# Patient Record
Sex: MALE | Race: WHITE | NOT HISPANIC OR LATINO | Employment: FULL TIME | URBAN - METROPOLITAN AREA
[De-identification: names, ages, dates, MRNs, and addresses within clinical notes are randomized per-mention and may not be internally consistent; named-entity substitution may affect disease eponyms.]

---

## 2023-02-28 LAB
ALANINE AMINOTRANSFERASE (SGPT) (U/L) IN SER/PLAS: 25 U/L (ref 10–52)
ALBUMIN (G/DL) IN SER/PLAS: 4.7 G/DL (ref 3.4–5)
ALKALINE PHOSPHATASE (U/L) IN SER/PLAS: 55 U/L (ref 33–120)
ANION GAP IN SER/PLAS: 15 MMOL/L (ref 10–20)
ASPARTATE AMINOTRANSFERASE (SGOT) (U/L) IN SER/PLAS: 21 U/L (ref 9–39)
BILIRUBIN TOTAL (MG/DL) IN SER/PLAS: 1.7 MG/DL (ref 0–1.2)
CALCIDIOL (25 OH VITAMIN D3) (NG/ML) IN SER/PLAS: 34 NG/ML
CALCIUM (MG/DL) IN SER/PLAS: 9.9 MG/DL (ref 8.6–10.3)
CARBON DIOXIDE, TOTAL (MMOL/L) IN SER/PLAS: 26 MMOL/L (ref 21–32)
CHLORIDE (MMOL/L) IN SER/PLAS: 105 MMOL/L (ref 98–107)
CHOLESTEROL (MG/DL) IN SER/PLAS: 97 MG/DL (ref 0–199)
CHOLESTEROL IN HDL (MG/DL) IN SER/PLAS: 36.2 MG/DL
CHOLESTEROL/HDL RATIO: 2.7
CREATININE (MG/DL) IN SER/PLAS: 1.28 MG/DL (ref 0.5–1.3)
ERYTHROCYTE DISTRIBUTION WIDTH (RATIO) BY AUTOMATED COUNT: 12.9 % (ref 11.5–14.5)
ERYTHROCYTE MEAN CORPUSCULAR HEMOGLOBIN CONCENTRATION (G/DL) BY AUTOMATED: 34.1 G/DL (ref 32–36)
ERYTHROCYTE MEAN CORPUSCULAR VOLUME (FL) BY AUTOMATED COUNT: 89 FL (ref 80–100)
ERYTHROCYTES (10*6/UL) IN BLOOD BY AUTOMATED COUNT: 5.16 X10E12/L (ref 4.5–5.9)
GFR MALE: 66 ML/MIN/1.73M2
GLUCOSE (MG/DL) IN SER/PLAS: 95 MG/DL (ref 74–99)
HEMATOCRIT (%) IN BLOOD BY AUTOMATED COUNT: 45.7 % (ref 41–52)
HEMOGLOBIN (G/DL) IN BLOOD: 15.6 G/DL (ref 13.5–17.5)
LDL: 37 MG/DL (ref 0–99)
LEUKOCYTES (10*3/UL) IN BLOOD BY AUTOMATED COUNT: 6.3 X10E9/L (ref 4.4–11.3)
NRBC (PER 100 WBCS) BY AUTOMATED COUNT: 0 /100 WBC (ref 0–0)
PLATELETS (10*3/UL) IN BLOOD AUTOMATED COUNT: NORMAL X10E9/L (ref 150–450)
POTASSIUM (MMOL/L) IN SER/PLAS: 4.5 MMOL/L (ref 3.5–5.3)
PROSTATE SPECIFIC AG (NG/ML) IN SER/PLAS: 1.57 NG/ML (ref 0–4)
PROTEIN TOTAL: 6.9 G/DL (ref 6.4–8.2)
SODIUM (MMOL/L) IN SER/PLAS: 141 MMOL/L (ref 136–145)
TRIGLYCERIDE (MG/DL) IN SER/PLAS: 119 MG/DL (ref 0–149)
URATE (MG/DL) IN SER/PLAS: 5.4 MG/DL (ref 4–7.5)
UREA NITROGEN (MG/DL) IN SER/PLAS: 18 MG/DL (ref 6–23)
VLDL: 24 MG/DL (ref 0–40)

## 2024-03-14 ENCOUNTER — LAB (OUTPATIENT)
Dept: LAB | Facility: LAB | Age: 59
End: 2024-03-14
Payer: COMMERCIAL

## 2024-03-14 DIAGNOSIS — Z12.5 ENCOUNTER FOR SCREENING FOR MALIGNANT NEOPLASM OF PROSTATE: Primary | ICD-10-CM

## 2024-03-14 LAB
25(OH)D3 SERPL-MCNC: 41 NG/ML (ref 30–100)
ALBUMIN SERPL BCP-MCNC: 4.6 G/DL (ref 3.4–5)
ALP SERPL-CCNC: 61 U/L (ref 33–120)
ALT SERPL W P-5'-P-CCNC: 26 U/L (ref 10–52)
ANION GAP SERPL CALC-SCNC: 13 MMOL/L (ref 10–20)
AST SERPL W P-5'-P-CCNC: 20 U/L (ref 9–39)
BASOPHILS # BLD AUTO: 0.07 X10*3/UL (ref 0–0.1)
BASOPHILS NFR BLD AUTO: 1.2 %
BILIRUB SERPL-MCNC: 1.8 MG/DL (ref 0–1.2)
BUN SERPL-MCNC: 18 MG/DL (ref 6–23)
CALCIUM SERPL-MCNC: 9.4 MG/DL (ref 8.6–10.3)
CHLORIDE SERPL-SCNC: 107 MMOL/L (ref 98–107)
CHOLEST SERPL-MCNC: 93 MG/DL (ref 0–199)
CHOLESTEROL/HDL RATIO: 3.4
CO2 SERPL-SCNC: 25 MMOL/L (ref 21–32)
CREAT SERPL-MCNC: 1.26 MG/DL (ref 0.5–1.3)
EGFRCR SERPLBLD CKD-EPI 2021: 67 ML/MIN/1.73M*2
EOSINOPHIL # BLD AUTO: 0.2 X10*3/UL (ref 0–0.7)
EOSINOPHIL NFR BLD AUTO: 3.5 %
ERYTHROCYTE [DISTWIDTH] IN BLOOD BY AUTOMATED COUNT: 12.8 % (ref 11.5–14.5)
GLUCOSE SERPL-MCNC: 98 MG/DL (ref 74–99)
HCT VFR BLD AUTO: 44.6 % (ref 41–52)
HDLC SERPL-MCNC: 27.4 MG/DL
HGB BLD-MCNC: 15.3 G/DL (ref 13.5–17.5)
IMM GRANULOCYTES # BLD AUTO: 0.02 X10*3/UL (ref 0–0.7)
IMM GRANULOCYTES NFR BLD AUTO: 0.3 % (ref 0–0.9)
LDLC SERPL CALC-MCNC: 37 MG/DL
LYMPHOCYTES # BLD AUTO: 2.08 X10*3/UL (ref 1.2–4.8)
LYMPHOCYTES NFR BLD AUTO: 36.2 %
MCH RBC QN AUTO: 30.7 PG (ref 26–34)
MCHC RBC AUTO-ENTMCNC: 34.3 G/DL (ref 32–36)
MCV RBC AUTO: 90 FL (ref 80–100)
MONOCYTES # BLD AUTO: 0.48 X10*3/UL (ref 0.1–1)
MONOCYTES NFR BLD AUTO: 8.4 %
NEUTROPHILS # BLD AUTO: 2.89 X10*3/UL (ref 1.2–7.7)
NEUTROPHILS NFR BLD AUTO: 50.4 %
NON HDL CHOLESTEROL: 66 MG/DL (ref 0–149)
NRBC BLD-RTO: 0 /100 WBCS (ref 0–0)
PLATELET # BLD AUTO: 78 X10*3/UL (ref 150–450)
POTASSIUM SERPL-SCNC: 3.9 MMOL/L (ref 3.5–5.3)
PROT SERPL-MCNC: 6.8 G/DL (ref 6.4–8.2)
PSA SERPL-MCNC: 1.88 NG/ML
RBC # BLD AUTO: 4.98 X10*6/UL (ref 4.5–5.9)
SODIUM SERPL-SCNC: 141 MMOL/L (ref 136–145)
TRIGL SERPL-MCNC: 142 MG/DL (ref 0–149)
URATE SERPL-MCNC: 5.6 MG/DL (ref 4–7.5)
VLDL: 28 MG/DL (ref 0–40)
WBC # BLD AUTO: 5.7 X10*3/UL (ref 4.4–11.3)

## 2024-03-14 PROCEDURE — 84153 ASSAY OF PSA TOTAL: CPT

## 2024-03-14 PROCEDURE — 80061 LIPID PANEL: CPT

## 2024-03-14 PROCEDURE — 85025 COMPLETE CBC W/AUTO DIFF WBC: CPT

## 2024-03-14 PROCEDURE — 82306 VITAMIN D 25 HYDROXY: CPT

## 2024-03-14 PROCEDURE — 80053 COMPREHEN METABOLIC PANEL: CPT

## 2024-03-14 PROCEDURE — 36415 COLL VENOUS BLD VENIPUNCTURE: CPT

## 2024-03-14 PROCEDURE — 84550 ASSAY OF BLOOD/URIC ACID: CPT

## 2024-04-16 PROBLEM — I10 BENIGN HYPERTENSION: Status: ACTIVE | Noted: 2024-04-16

## 2024-04-16 PROBLEM — M67.441 GANGLION CYST OF TENDON SHEATH OF RIGHT HAND: Status: ACTIVE | Noted: 2024-04-16

## 2024-04-16 PROBLEM — E78.5 DYSLIPIDEMIA: Status: ACTIVE | Noted: 2024-04-16

## 2024-04-16 PROBLEM — I25.10 ARTERIOSCLEROSIS OF CORONARY ARTERY: Status: ACTIVE | Noted: 2024-04-16

## 2024-04-16 PROBLEM — E55.9 VITAMIN D DEFICIENCY: Status: ACTIVE | Noted: 2024-04-16

## 2024-04-16 PROBLEM — I47.11 INAPPROPRIATE SINUS TACHYCARDIA (CMS-HCC): Status: ACTIVE | Noted: 2024-04-16

## 2024-04-16 PROBLEM — K21.9 GASTROESOPHAGEAL REFLUX DISEASE: Status: ACTIVE | Noted: 2024-04-16

## 2024-04-16 PROBLEM — R93.89 ABNORMAL COMPUTED TOMOGRAPHY SCAN: Status: ACTIVE | Noted: 2024-04-16

## 2024-04-16 PROBLEM — K44.9 HERNIA, DIAPHRAGMATIC: Status: ACTIVE | Noted: 2024-04-16

## 2024-04-16 PROBLEM — L05.91 SACROCOCCYGEAL PILONIDAL CYST: Status: ACTIVE | Noted: 2024-04-16

## 2024-04-16 PROBLEM — S61.012S THUMB LACERATION, LEFT, SEQUELA: Status: ACTIVE | Noted: 2024-04-16

## 2024-05-01 ENCOUNTER — OFFICE VISIT (OUTPATIENT)
Dept: CARDIOLOGY | Facility: CLINIC | Age: 59
End: 2024-05-01
Payer: COMMERCIAL

## 2024-05-01 VITALS
SYSTOLIC BLOOD PRESSURE: 130 MMHG | WEIGHT: 207.6 LBS | HEIGHT: 71 IN | BODY MASS INDEX: 29.06 KG/M2 | OXYGEN SATURATION: 99 % | DIASTOLIC BLOOD PRESSURE: 84 MMHG | HEART RATE: 93 BPM

## 2024-05-01 DIAGNOSIS — I47.11 INAPPROPRIATE SINUS TACHYCARDIA (CMS-HCC): ICD-10-CM

## 2024-05-01 DIAGNOSIS — I25.10 ARTERIOSCLEROSIS OF CORONARY ARTERY: ICD-10-CM

## 2024-05-01 DIAGNOSIS — I10 BENIGN HYPERTENSION: ICD-10-CM

## 2024-05-01 DIAGNOSIS — I45.10 RBBB: ICD-10-CM

## 2024-05-01 DIAGNOSIS — E78.5 DYSLIPIDEMIA: ICD-10-CM

## 2024-05-01 DIAGNOSIS — I25.84 CORONARY ARTERY CALCIFICATION: Primary | ICD-10-CM

## 2024-05-01 DIAGNOSIS — I25.10 CORONARY ARTERY CALCIFICATION: Primary | ICD-10-CM

## 2024-05-01 PROCEDURE — 3075F SYST BP GE 130 - 139MM HG: CPT | Performed by: STUDENT IN AN ORGANIZED HEALTH CARE EDUCATION/TRAINING PROGRAM

## 2024-05-01 PROCEDURE — 3079F DIAST BP 80-89 MM HG: CPT | Performed by: STUDENT IN AN ORGANIZED HEALTH CARE EDUCATION/TRAINING PROGRAM

## 2024-05-01 PROCEDURE — 99214 OFFICE O/P EST MOD 30 MIN: CPT | Performed by: STUDENT IN AN ORGANIZED HEALTH CARE EDUCATION/TRAINING PROGRAM

## 2024-05-01 PROCEDURE — 93000 ELECTROCARDIOGRAM COMPLETE: CPT | Performed by: STUDENT IN AN ORGANIZED HEALTH CARE EDUCATION/TRAINING PROGRAM

## 2024-05-01 RX ORDER — PANTOPRAZOLE SODIUM 40 MG/1
1 TABLET, DELAYED RELEASE ORAL DAILY
COMMUNITY
Start: 2015-09-08

## 2024-05-01 RX ORDER — OMEGA-3-ACID ETHYL ESTERS 1 G/1
1 CAPSULE, LIQUID FILLED ORAL
COMMUNITY

## 2024-05-01 RX ORDER — LORATADINE 10 MG/1
10 TABLET ORAL
COMMUNITY

## 2024-05-01 RX ORDER — ASPIRIN 81 MG/1
81 TABLET ORAL DAILY
COMMUNITY

## 2024-05-01 RX ORDER — METOPROLOL SUCCINATE 25 MG/1
1 TABLET, EXTENDED RELEASE ORAL DAILY
COMMUNITY
Start: 2019-02-14 | End: 2024-05-01 | Stop reason: SDUPTHER

## 2024-05-01 RX ORDER — METOPROLOL SUCCINATE 25 MG/1
25 TABLET, EXTENDED RELEASE ORAL DAILY
Qty: 90 TABLET | Refills: 3 | Status: SHIPPED | OUTPATIENT
Start: 2024-05-01 | End: 2025-05-01

## 2024-05-01 RX ORDER — ALLOPURINOL 300 MG/1
1 TABLET ORAL DAILY
COMMUNITY
Start: 2015-08-31

## 2024-05-01 RX ORDER — ATORVASTATIN CALCIUM 20 MG/1
20 TABLET, FILM COATED ORAL DAILY
Qty: 30 TABLET | Refills: 3 | Status: SHIPPED | OUTPATIENT
Start: 2024-05-01 | End: 2025-05-01

## 2024-05-01 RX ORDER — VIT C/E/ZN/COPPR/LUTEIN/ZEAXAN 250MG-90MG
25 CAPSULE ORAL DAILY
COMMUNITY

## 2024-05-01 RX ORDER — ATORVASTATIN CALCIUM 20 MG/1
1 TABLET, FILM COATED ORAL DAILY
COMMUNITY
Start: 2018-11-08 | End: 2024-05-01 | Stop reason: SDUPTHER

## 2024-05-01 RX ORDER — ASPIRIN 325 MG
1 TABLET, DELAYED RELEASE (ENTERIC COATED) ORAL DAILY
COMMUNITY
Start: 2018-11-08 | End: 2024-05-01 | Stop reason: WASHOUT

## 2024-05-01 NOTE — PROGRESS NOTES
"Chief Complaint:   Hypertension and Rapid Heart Rate (Yearly)     History Of Present Illness:    Khai Oleary is a 58 y.o. male returns to clinic for follow-up appointment.  Patient is doing well since his last appointment.  Denies chest pain or shortness of breath.  No palpitations.  Compliant with current medication regimen.  Most recent labs reviewed and cholesterol is at goal.  EKG shows sinus rhythm with right bundle branch block morphology.     Last Recorded Vitals:  Vitals:    05/01/24 1055   BP: 130/84   BP Location: Left arm   Patient Position: Sitting   BP Cuff Size: Adult   Pulse: 93   SpO2: 99%   Weight: 94.2 kg (207 lb 9.6 oz)   Height: 1.803 m (5' 11\")       Review of Systems  ROS      Allergies:  Penicillins, Prochlorperazine, and Prochlorperazine edisylate    Outpatient Medications:  Current Outpatient Medications   Medication Instructions    allopurinol (Zyloprim) 300 mg tablet 1 tablet, oral, Daily    aspirin 81 mg, oral, Daily    atorvastatin (LIPITOR) 20 mg, oral, Daily    cholecalciferol (VITAMIN D3) 25 mcg, oral, Daily    loratadine (CLARITIN) 10 mg, oral, Daily RT    metoprolol succinate XL (TOPROL-XL) 25 mg, oral, Daily    omega-3 acid ethyl esters (LOVAZA) 1 g, oral, Daily RT    pantoprazole (ProtoNix) 40 mg EC tablet 1 tablet, oral, Daily       Physical Exam:  General: No acute distress,  A&O x3  Skin: Warm and dry  Neck: JVD is not elevated  ENT: Moist mucous membranes no lesions appreciated  Pulmonary: CTAB  Cards: Regular rate rhythm, no murmurs gallops or rubs appreciated normal S1-S2  Abdomen: Soft nontender nondistended  Extremities: No edema or cyanosis  Psych: Appropriate mood and affect        Last Labs:  CBC -  Lab Results   Component Value Date    WBC 5.7 03/14/2024    HGB 15.3 03/14/2024    HCT 44.6 03/14/2024    MCV 90 03/14/2024    PLT 78 (L) 03/14/2024       CMP -  Lab Results   Component Value Date    CALCIUM 9.4 03/14/2024    PROT 6.8 03/14/2024    ALBUMIN 4.6 " "03/14/2024    AST 20 03/14/2024    ALT 26 03/14/2024    ALKPHOS 61 03/14/2024    BILITOT 1.8 (H) 03/14/2024       LIPID PANEL -   Lab Results   Component Value Date    CHOL 93 03/14/2024    TRIG 142 03/14/2024    HDL 27.4 03/14/2024    CHHDL 3.4 03/14/2024    LDLF 37 02/28/2023    VLDL 28 03/14/2024    NHDL 66 03/14/2024       RENAL FUNCTION PANEL -   Lab Results   Component Value Date    GLUCOSE 98 03/14/2024     03/14/2024    K 3.9 03/14/2024     03/14/2024    CO2 25 03/14/2024    ANIONGAP 13 03/14/2024    BUN 18 03/14/2024    CREATININE 1.26 03/14/2024    GFRMALE 66 02/28/2023    CALCIUM 9.4 03/14/2024    ALBUMIN 4.6 03/14/2024        No results found for: \"BNP\", \"HGBA1C\"    Last Cardiology Tests:  ECG:  No results found for this or any previous visit (from the past 4464 hour(s)).     Echo:  No results found for this or any previous visit from the past 1095 days.     Assessment and Plan    1. Subclinical CAD based on prior calcium score. Calcium score 76.5. Takes normal LV function by echocardiogram. Aspirin and statin therapy. No angina symptoms.  Baseline EKG shows sinus rhythm with a right bundle branch morphology.     -Repeat calcium score ordered     2. Hypertension: At goal today. Continue current medication regiment.     3. Inappropriate sinus tachycardia. Well controlled with current dose of metoprolol succinate.     4. Dyslipidemia: Most recent lipid panel shows well-controlled lipids. Continue current dose of atorvastatin.     (This note was generated with voice recognition software and may contain errors including spelling, grammar, syntax and missed recognition of what was dictated, of which may not have been fully corrected)     Kee Turner MD PhD  "

## 2024-06-15 ENCOUNTER — HOSPITAL ENCOUNTER (OUTPATIENT)
Dept: RADIOLOGY | Facility: HOSPITAL | Age: 59
Discharge: HOME | End: 2024-06-15
Payer: COMMERCIAL

## 2024-06-15 DIAGNOSIS — I10 BENIGN HYPERTENSION: ICD-10-CM

## 2024-06-15 DIAGNOSIS — I45.10 RBBB: ICD-10-CM

## 2024-06-15 DIAGNOSIS — E78.5 DYSLIPIDEMIA: ICD-10-CM

## 2024-06-15 DIAGNOSIS — I25.10 CORONARY ARTERY CALCIFICATION: ICD-10-CM

## 2024-06-15 DIAGNOSIS — I47.11 INAPPROPRIATE SINUS TACHYCARDIA (CMS-HCC): ICD-10-CM

## 2024-06-15 DIAGNOSIS — I25.84 CORONARY ARTERY CALCIFICATION: ICD-10-CM

## 2024-06-15 PROCEDURE — 75571 CT HRT W/O DYE W/CA TEST: CPT

## 2024-08-31 DIAGNOSIS — I25.10 CORONARY ARTERY CALCIFICATION: ICD-10-CM

## 2024-08-31 DIAGNOSIS — I47.11 INAPPROPRIATE SINUS TACHYCARDIA (CMS-HCC): ICD-10-CM

## 2024-08-31 DIAGNOSIS — I45.10 RBBB: ICD-10-CM

## 2024-08-31 DIAGNOSIS — I25.84 CORONARY ARTERY CALCIFICATION: ICD-10-CM

## 2024-08-31 DIAGNOSIS — E78.5 DYSLIPIDEMIA: ICD-10-CM

## 2024-08-31 DIAGNOSIS — I10 BENIGN HYPERTENSION: ICD-10-CM

## 2024-09-03 RX ORDER — ATORVASTATIN CALCIUM 20 MG/1
20 TABLET, FILM COATED ORAL DAILY
Qty: 90 TABLET | Refills: 3 | Status: SHIPPED | OUTPATIENT
Start: 2024-09-03

## 2025-04-17 ENCOUNTER — TELEPHONE (OUTPATIENT)
Dept: CARDIOLOGY | Facility: CLINIC | Age: 60
End: 2025-04-17
Payer: COMMERCIAL

## 2025-04-17 NOTE — TELEPHONE ENCOUNTER
Khai is returning your call about his apt being canceled . Please call him to get him rescheduled.  Will be running out of meds. Please squeeze him in somewhere. He can be reached at :    [phone]

## 2025-05-02 ENCOUNTER — APPOINTMENT (OUTPATIENT)
Dept: CARDIOLOGY | Facility: CLINIC | Age: 60
End: 2025-05-02
Payer: COMMERCIAL

## 2025-05-02 ENCOUNTER — OFFICE VISIT (OUTPATIENT)
Dept: CARDIOLOGY | Facility: CLINIC | Age: 60
End: 2025-05-02
Payer: COMMERCIAL

## 2025-05-02 VITALS
SYSTOLIC BLOOD PRESSURE: 122 MMHG | BODY MASS INDEX: 28.87 KG/M2 | OXYGEN SATURATION: 97 % | HEART RATE: 90 BPM | WEIGHT: 207 LBS | DIASTOLIC BLOOD PRESSURE: 92 MMHG

## 2025-05-02 DIAGNOSIS — I47.11 INAPPROPRIATE SINUS TACHYCARDIA (CMS-HCC): ICD-10-CM

## 2025-05-02 DIAGNOSIS — I45.10 RBBB: ICD-10-CM

## 2025-05-02 DIAGNOSIS — I10 BENIGN HYPERTENSION: ICD-10-CM

## 2025-05-02 DIAGNOSIS — I25.10 ARTERIOSCLEROSIS OF CORONARY ARTERY: ICD-10-CM

## 2025-05-02 DIAGNOSIS — R93.1 ELEVATED CORONARY ARTERY CALCIUM SCORE: ICD-10-CM

## 2025-05-02 DIAGNOSIS — I25.10 CORONARY ARTERY CALCIFICATION: Primary | ICD-10-CM

## 2025-05-02 DIAGNOSIS — E78.5 DYSLIPIDEMIA: ICD-10-CM

## 2025-05-02 LAB
ATRIAL RATE: 90 BPM
P AXIS: 45 DEGREES
P OFFSET: 210 MS
P ONSET: 159 MS
PR INTERVAL: 140 MS
Q ONSET: 229 MS
QRS COUNT: 15 BEATS
QRS DURATION: 130 MS
QT INTERVAL: 412 MS
QTC CALCULATION(BAZETT): 504 MS
QTC FREDERICIA: 471 MS
R AXIS: -8 DEGREES
T AXIS: 44 DEGREES
T OFFSET: 435 MS
VENTRICULAR RATE: 90 BPM

## 2025-05-02 PROCEDURE — 93005 ELECTROCARDIOGRAM TRACING: CPT

## 2025-05-02 PROCEDURE — 3074F SYST BP LT 130 MM HG: CPT

## 2025-05-02 PROCEDURE — 99205 OFFICE O/P NEW HI 60 MIN: CPT

## 2025-05-02 PROCEDURE — 1036F TOBACCO NON-USER: CPT

## 2025-05-02 PROCEDURE — 99212 OFFICE O/P EST SF 10 MIN: CPT

## 2025-05-02 PROCEDURE — 3080F DIAST BP >= 90 MM HG: CPT

## 2025-05-02 RX ORDER — METOPROLOL SUCCINATE 25 MG/1
25 TABLET, EXTENDED RELEASE ORAL DAILY
Qty: 90 TABLET | Refills: 3 | Status: SHIPPED | OUTPATIENT
Start: 2025-05-02 | End: 2026-05-02

## 2025-05-02 RX ORDER — ATORVASTATIN CALCIUM 20 MG/1
20 TABLET, FILM COATED ORAL DAILY
Qty: 90 TABLET | Refills: 3 | Status: SHIPPED | OUTPATIENT
Start: 2025-05-02

## 2025-05-02 NOTE — PROGRESS NOTES
Chief Complaint:   New Patient Visit, Hypertension, and Coronary Artery Disease     History Of Present Illness:    Khai Oleary is a 59 y.o. male with PMHx of CAD, HTN, HLD, inappropriate sinus tachycardia, RBBB, GERD, and hernia presenting presented today for his annual follow-up.  He reports he has been feeling well since last seen in our office last year.  He denies any new cardiac symptoms. He denies any CP, SOB, palpitations, lightheadedness, syncope, orthopnea, PND, lower extremity edema. He is physically active and is exercising 3-4x/week, total gym and weights.  He reports no chest pain or shortness of breath with exertion.    Last Recorded Vitals:  Vitals:    05/02/25 1207   BP: (!) 122/92   BP Location: Left arm   Patient Position: Sitting   BP Cuff Size: Large adult   Pulse: 90   SpO2: 97%   Weight: 93.9 kg (207 lb)     Past Medical History:  He has a past medical history of Abnormal findings on diagnostic imaging of other specified body structures, Personal history of other medical treatment, and Personal history of other medical treatment.    Past Surgical History:  He has a past surgical history that includes Other surgical history (12/20/2019) and Other surgical history (12/20/2019).      Social History:  He reports that he has never smoked. He has never used smokeless tobacco. No history on file for alcohol use and drug use.    Family History:  Family History[1]     Allergies:  Penicillins, Prochlorperazine, and Prochlorperazine edisylate    Outpatient Medications:  Current Outpatient Medications   Medication Instructions    allopurinol (Zyloprim) 300 mg tablet 1 tablet, Daily    aspirin 81 mg, Daily    atorvastatin (LIPITOR) 20 mg, oral, Daily    cholecalciferol (VITAMIN D3) 25 mcg, Daily    loratadine (CLARITIN) 10 mg, Daily RT    metoprolol succinate XL (TOPROL-XL) 25 mg, oral, Daily    omega-3 acid ethyl esters (LOVAZA) 1 g, Daily RT    pantoprazole (ProtoNix) 40 mg EC tablet 1 tablet, Daily  "    Physical Exam:  General: no acute distress  HEENT: EOMI, no scleral icterus.  Lungs: Clear to auscultation bilaterally without wheezing, rales, or rhonchi.  Cardiovascular: Regular rhythm and rate. Normal S1 and S2. No murmurs, rubs, or gallops are appreciated. JVP normal.  Abdomen: Soft, nontender, nondistended. Bowel sounds present.  Extremities: Warm and well perfused with equal 2+ pulses bilaterally.  No edema.  Neurologic: Alert and oriented x3.      Last Labs:  CBC -  Lab Results   Component Value Date    WBC 5.7 03/14/2024    HGB 15.3 03/14/2024    HCT 44.6 03/14/2024    MCV 90 03/14/2024    PLT 78 (L) 03/14/2024     CMP -  Lab Results   Component Value Date    CALCIUM 9.4 03/14/2024    PROT 6.8 03/14/2024    ALBUMIN 4.6 03/14/2024    AST 20 03/14/2024    ALT 26 03/14/2024    ALKPHOS 61 03/14/2024    BILITOT 1.8 (H) 03/14/2024     LIPID PANEL -   Lab Results   Component Value Date    CHOL 93 03/14/2024    TRIG 142 03/14/2024    HDL 27.4 03/14/2024    CHHDL 3.4 03/14/2024    LDLF 37 02/28/2023    VLDL 28 03/14/2024    NHDL 66 03/14/2024     RENAL FUNCTION PANEL -   Lab Results   Component Value Date    GLUCOSE 98 03/14/2024     03/14/2024    K 3.9 03/14/2024     03/14/2024    CO2 25 03/14/2024    ANIONGAP 13 03/14/2024    BUN 18 03/14/2024    CREATININE 1.26 03/14/2024    GFRMALE 66 02/28/2023    CALCIUM 9.4 03/14/2024    ALBUMIN 4.6 03/14/2024     No results found for: \"BNP\", \"HGBA1C\"    Last Cardiology Tests:  ECG:  ECG 12 lead (Clinic Performed) 05/01/2024  NSR with ventricular rate of 90 bpm  RBBB    Echo:  No results found for this or any previous visit from the past 1095 days.    Ejection Fractions:  No results found for: \"EF\"    Cath:  No results found for this or any previous visit from the past 1095 days.    Stress Test:  Nuclear Stress test 10/5/2018   1. Normal ST segment response to high peak work load, maximal exercise tolerance test.   2. Persistent apparent sinus tachycardia in " recovery, responding to metoprolol 25mg daily.   3. Nuclear results reported separately.     Cardiac Imaging:  CT cardiac scoring wo IV contrast 06/15/2024  LM: 20.92.  LAD: 183.25.  LCx: 0.  RCA: 8.77.  Total: 212.94, increased from 76.47 on 10/29/2018.    Carotid duplex 4/16/2019  Right Carotid: <50% stenosis of the right proximal ICA. Laminar flow seen by color Doppler. Right external carotid artery appears patent with no evidence of stenosis. The right vertebral artery is patent with antegrade flow. No evidence of hemodynamically significant stenosis in the right subclavian.  Left Carotid: <50% stenosis of the left proximal ICA. Laminar flow seen by color Doppler. Left external carotid artery appears patent with no evidence of stenosis. The left vertebral artery is patent with antegrade flow.  No evidence of hemodynamically significant stenosis in the left subclavian.    CT calcium score 10/29/2018  LM: 0.3.  LAD: 70.9.  LCx: 0.  RCA: 5.2.  Total: 76.5.    I have personally reviewed most recent PCP, cardiology, vascular, studies and/or documentation.      Assessment/Plan   CAD, elevated CT calcium score of 212.94 units which is an increase from 76 units on 10/29/2018. His nuclear stress test in 2018 was normal.  EKG in office today showing NSR with RBBB. He denies any complaints of chest pain today's visit whatsoever. Continue aspirin and high intensity statin therapy.    RBBB, this was noted on his previous EKG.    Inappropriate sinus tachycardia, he is currently on metoprolol succinate 25 mg daily.  This is currently well-controlled.    HLD, 3/14/2024 LDL 37, HDL 27.4, triglycerides 142.  He is on atorvastatin 20 mg daily.  Goal LDL <55.  Repeat fasting blood work can be done with his PCP.    HTN, well-controlled, /92 today.  He is on metoprolol succinate 25 mg daily.    Follow up with Dr. Turner in 1 year.     Penny Raza, APRN-CNP         [1] No family history on file.

## 2025-05-14 ENCOUNTER — APPOINTMENT (OUTPATIENT)
Dept: CARDIOLOGY | Facility: CLINIC | Age: 60
End: 2025-05-14
Payer: COMMERCIAL